# Patient Record
Sex: FEMALE | Race: WHITE
[De-identification: names, ages, dates, MRNs, and addresses within clinical notes are randomized per-mention and may not be internally consistent; named-entity substitution may affect disease eponyms.]

---

## 2020-06-14 ENCOUNTER — NURSE TRIAGE (OUTPATIENT)
Dept: OTHER | Facility: CLINIC | Age: 45
End: 2020-06-14

## 2020-06-14 NOTE — TELEPHONE ENCOUNTER
Reason for Disposition   Inhalation of smoke or fumes   Chemical fume inhalation (Exception: harmless strong-smelling fumes like perfume or household )    Answer Assessment - Initial Assessment Questions  1. SUBSTANCE: \"What was swallowed? \" If necessary, have the caller look at the label on the container. 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 AMOUNT: \"How much was swallowed? \" (Err on the side of recording the maximal amount that is missing)      Unsure, inhaled  3. ONSET: \"When was it probably swallowed? \" (Minutes or hours ago)      X 2 days  4. SYMPTOMS: \"Do you have any symptoms? \" If so, ask: \"What are they? \" (e.g., abdominal pain, vomiting, weakness)      Burning in nose and throat  5. SUICIDAL: \"Did you take this to hurt or kill yourself?\"       6. PREGNANCY: \"Is there any chance you are pregnant? \" \"When was your last menstrual period? 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 .. pt sts she thinks the chemical caused an irritation in her nose and throat. Rusty Brush describes as a \"burning\" sensation. . Pt instructed to call poison control and to let her supervisor know. Pt sts she missed work today and feels some better. . referred to follow up as instructed.  Pt verbalized understanding

## 2022-04-24 ENCOUNTER — NURSE TRIAGE (OUTPATIENT)
Dept: OTHER | Facility: CLINIC | Age: 47
End: 2022-04-24

## 2022-04-24 NOTE — TELEPHONE ENCOUNTER
Subjective: Caller states blood sugar was 289. Home glucose machine. Pt is newly dx with DM 2 on Friday. Pt states states she was started on Metformin and Glypizide. Frequent urination after receiving IV fluids and drinking fluids. Pt states she was seen in the ED yesterday and this morning. Pt denies any symptoms at this time. Current Symptoms: Hyperglycemia     Onset: 2 days ago; gradual    Associated Symptoms: NA    Pain Severity: 0/10; N/A; none    Temperature: Denies Does not feel feverish    What has been tried:     LMP: 2 weeks ago Pregnant: No    Recommended disposition: Micheal Purdy [id number] advice provided, patient verbalizes understanding; denies any other questions or concerns; instructed to call back for any new or worsening symptoms. Home care and monitor    This triage is a result of a call to [address] [place] [address]. Please do not respond to the triage nurse through this encounter. Any subsequent communication should be directly with the patient.         Reason for Disposition   [1] Blood glucose > 300 mg/dL (16.7 mmol/L) AND [2] does not  use insulin (e.g., not insulin-dependent; most people with type 2 diabetes)    Protocols used: DIABETES - HIGH BLOOD SUGAR-ADULT-

## 2022-04-25 NOTE — TELEPHONE ENCOUNTER
Subjective: Caller states \"I took two metformin tonight by mistake. \"     Current Symptoms: Recently diagnosed with diabetes two days ago, on metformin 500mg twice daily, took one this morning, one at [id number] and another one about 2209. Also on glipizide once daily. FSBS 417 and 303 at home. Onset: just now     Associated Symptoms: NA    Pain Severity: 0/10; N/A; none    Temperature: no fever    What has been tried: nothing yet    LMP: NA Pregnant: No    Recommended disposition: Call PCP now    Care advice provided, patient verbalizes understanding; denies any other questions or concerns; instructed to call back for any new or worsening symptoms. Patient does not have a PCP and has decided to proceed to [facility], writer concurs with this decision. This triage is a result of a call to [address] [place] [address]. Please do not respond to the triage nurse through this encounter. Any subsequent communication should be directly with the patient.         Reason for Disposition   [1] DOUBLE DOSE (an extra dose or lesser amount) of prescription drug AND [2] NO symptoms (Exception: a double dose of antibiotics)   [1] Blood glucose > 300 mg/dL (16.7 mmol/L) AND [2] does not  use insulin (e.g., not insulin-dependent; most people with type 2 diabetes)    Protocols used: MEDICATION QUESTION CALL-ADULT-, DIABETES - HIGH BLOOD SUGAR-ADULT-

## 2022-05-25 ENCOUNTER — NURSE TRIAGE (OUTPATIENT)
Dept: OTHER | Facility: CLINIC | Age: 47
End: 2022-05-25